# Patient Record
(demographics unavailable — no encounter records)

---

## 2024-11-08 NOTE — HISTORY OF PRESENT ILLNESS
[Postpartum Follow Up] : postpartum follow up [Vaginal Delivery] : vaginal delivery [Delivery Date Was ___] : delivery date was [unfilled] [Boy] : baby is a boy [Infant's Name ___] : [unfilled] [___ Lbs] : [unfilled] lbs [Not Circumcised] : not circumcised [Living at Home] : is currently living at home [Bottle Feeding] : bottle feeding [Resumed Menses] : has resumed her menses [Complications:___] : no complications [] : delivered by vaginal delivery [Breastfeeding] : not currently nursing [Resumed [place]] : has not resumed intercourse [Intended Contraception] : the patient does not intended to use contraception postpartum [Awake] : awake [Alert] : alert [Acute Distress] : no acute distress [Mass] : no breast mass [Nipple Discharge] : no nipple discharge [Axillary LAD] : no axillary lymphadenopathy [Soft] : soft [Tender] : non tender [Distended] : not distended [Oriented x3] : oriented to person, place, and time [Depressed Mood] : not depressed [Flat Affect] : affect not flat [Labia Majora Erythema] : no erythema of the labia majora [Labia Minora Erythema] : no erythema of the labia minora [Normal] : external genitalia [No Bleeding] : there was no active vaginal bleeding [Labia Majora] : labia major [Motion Tenderness] : there was no cervical motion tenderness [Tenderness] : nontender [Adnexa Tenderness] : were not tender [Doing Well] : is doing well [No Sign of Infection] : is showing no signs of infection [Excellent Pain Control] : has excellent pain control [None] : None [FreeTextEntry8] : This 28 yo P2 presents for PPV after vaginal delivery, feels well, voiding and stooling without issue, declines OCP for now, as she has not thought about contraception. [de-identified] : Nl PPV [de-identified] : RTO prn or for annual in 4 months

## 2025-03-20 NOTE — PHYSICAL EXAM
[Chaperone Present] : A chaperone was present in the examining room during all aspects of the physical examination [FreeTextEntry2] : [facility] MOA [Appropriately responsive] : appropriately responsive [Alert] : alert [No Acute Distress] : no acute distress [Regular Rate Rhythm] : regular rate rhythm [No Murmurs] : no murmurs [Soft] : soft [Non-tender] : non-tender [Non-distended] : non-distended [No Mass] : no mass [Oriented x3] : oriented x3 [FreeTextEntry5] : non labored breathing [Examination Of The Breasts] : a normal appearance [No Masses] : no breast masses were palpable [Labia Majora] : normal [Labia Minora] : normal [Normal] : normal [Uterine Adnexae] : normal

## 2025-03-20 NOTE — HISTORY OF PRESENT ILLNESS
[FreeTextEntry1] : 26 y/o P2 presents for well woman visit Doing well, no concerns. No changes in med/surgical history since last visit  will be able to come back from [place] in the summer, his paperwork passed